# Patient Record
Sex: MALE | Race: WHITE
[De-identification: names, ages, dates, MRNs, and addresses within clinical notes are randomized per-mention and may not be internally consistent; named-entity substitution may affect disease eponyms.]

---

## 2022-11-06 ENCOUNTER — HOSPITAL ENCOUNTER (EMERGENCY)
Dept: HOSPITAL 75 - ER FS | Age: 32
Discharge: LEFT BEFORE DISCHARGE | End: 2022-11-06
Payer: SELF-PAY

## 2022-11-06 ENCOUNTER — HOSPITAL ENCOUNTER (EMERGENCY)
Dept: HOSPITAL 75 - ER FS | Age: 32
LOS: 3 days | Discharge: HOME | End: 2022-11-09
Payer: COMMERCIAL

## 2022-11-06 VITALS — BODY MASS INDEX: 27.13 KG/M2 | HEIGHT: 69.69 IN | WEIGHT: 187.39 LBS

## 2022-11-06 VITALS — SYSTOLIC BLOOD PRESSURE: 122 MMHG | DIASTOLIC BLOOD PRESSURE: 81 MMHG

## 2022-11-06 DIAGNOSIS — Z20.822: ICD-10-CM

## 2022-11-06 DIAGNOSIS — R69: Primary | ICD-10-CM

## 2022-11-06 DIAGNOSIS — F22: Primary | ICD-10-CM

## 2022-11-06 LAB
ALBUMIN SERPL-MCNC: 4.6 GM/DL (ref 3.2–4.5)
ALP SERPL-CCNC: 68 U/L (ref 40–136)
ALT SERPL-CCNC: 31 U/L (ref 0–55)
APAP SERPL-MCNC: < 10 UG/ML (ref 10–30)
APTT PPP: YELLOW S
BACTERIA #/AREA URNS HPF: NEGATIVE /HPF
BARBITURATES UR QL: NEGATIVE
BASOPHILS # BLD AUTO: 0 10^3/UL (ref 0–0.1)
BASOPHILS NFR BLD AUTO: 0 % (ref 0–10)
BENZODIAZ UR QL SCN: NEGATIVE
BILIRUB SERPL-MCNC: 0.7 MG/DL (ref 0.1–1)
BILIRUB UR QL STRIP: NEGATIVE
BUN/CREAT SERPL: 10
CALCIUM SERPL-MCNC: 9 MG/DL (ref 8.5–10.1)
CHLORIDE SERPL-SCNC: 98 MMOL/L (ref 98–107)
CO2 SERPL-SCNC: 23 MMOL/L (ref 21–32)
COCAINE UR QL: NEGATIVE
CREAT SERPL-MCNC: 0.83 MG/DL (ref 0.6–1.3)
EOSINOPHIL # BLD AUTO: 0.2 10^3/UL (ref 0–0.3)
EOSINOPHIL NFR BLD AUTO: 2 % (ref 0–10)
FIBRINOGEN PPP-MCNC: CLEAR MG/DL
GFR SERPLBLD BASED ON 1.73 SQ M-ARVRAT: 118 ML/MIN
GLUCOSE SERPL-MCNC: 105 MG/DL (ref 70–105)
GLUCOSE UR STRIP-MCNC: NEGATIVE MG/DL
HCT VFR BLD CALC: 37 % (ref 40–54)
HGB BLD-MCNC: 13.2 G/DL (ref 13.3–17.7)
KETONES UR QL STRIP: NEGATIVE
LEUKOCYTE ESTERASE UR QL STRIP: NEGATIVE
LYMPHOCYTES # BLD AUTO: 2.9 10^3/UL (ref 1–4)
LYMPHOCYTES NFR BLD AUTO: 32 % (ref 12–44)
MANUAL DIFFERENTIAL PERFORMED BLD QL: NO
MCH RBC QN AUTO: 30 PG (ref 25–34)
MCHC RBC AUTO-ENTMCNC: 36 G/DL (ref 32–36)
MCV RBC AUTO: 83 FL (ref 80–99)
METHADONE UR QL SCN: NEGATIVE
MONOCYTES # BLD AUTO: 0.6 10^3/UL (ref 0–1)
MONOCYTES NFR BLD AUTO: 7 % (ref 0–12)
NEUTROPHILS # BLD AUTO: 5.4 10^3/UL (ref 1.8–7.8)
NEUTROPHILS NFR BLD AUTO: 59 % (ref 42–75)
NITRITE UR QL STRIP: NEGATIVE
OPIATES UR QL SCN: NEGATIVE
OXYCODONE UR QL: NEGATIVE
PH UR STRIP: 6.5 [PH] (ref 5–9)
PLATELET # BLD: 294 10^3/UL (ref 130–400)
PMV BLD AUTO: 9.3 FL (ref 9–12.2)
POTASSIUM SERPL-SCNC: 4.1 MMOL/L (ref 3.6–5)
PROPOXYPH UR QL: NEGATIVE
PROT SERPL-MCNC: 7 GM/DL (ref 6.4–8.2)
PROT UR QL STRIP: NEGATIVE
RBC #/AREA URNS HPF: (no result) /HPF
SALICYLATES SERPL-MCNC: < 0.3 MG/DL (ref 5–20)
SODIUM SERPL-SCNC: 134 MMOL/L (ref 135–145)
SP GR UR STRIP: <=1.005 (ref 1.02–1.02)
SQUAMOUS #/AREA URNS HPF: (no result) /HPF
TRICYCLICS UR QL SCN: NEGATIVE
WBC # BLD AUTO: 9.1 10^3/UL (ref 4.3–11)
WBC #/AREA URNS HPF: (no result) /HPF

## 2022-11-06 PROCEDURE — 80329 ANALGESICS NON-OPIOID 1 OR 2: CPT

## 2022-11-06 PROCEDURE — 85025 COMPLETE CBC W/AUTO DIFF WBC: CPT

## 2022-11-06 PROCEDURE — 99281 EMR DPT VST MAYX REQ PHY/QHP: CPT

## 2022-11-06 PROCEDURE — 80320 DRUG SCREEN QUANTALCOHOLS: CPT

## 2022-11-06 PROCEDURE — 93005 ELECTROCARDIOGRAM TRACING: CPT

## 2022-11-06 PROCEDURE — 80306 DRUG TEST PRSMV INSTRMNT: CPT

## 2022-11-06 PROCEDURE — 36415 COLL VENOUS BLD VENIPUNCTURE: CPT

## 2022-11-06 PROCEDURE — 80053 COMPREHEN METABOLIC PANEL: CPT

## 2022-11-06 PROCEDURE — 99283 EMERGENCY DEPT VISIT LOW MDM: CPT

## 2022-11-06 PROCEDURE — 87636 SARSCOV2 & INF A&B AMP PRB: CPT

## 2022-11-06 PROCEDURE — 81000 URINALYSIS NONAUTO W/SCOPE: CPT

## 2022-11-06 PROCEDURE — 84443 ASSAY THYROID STIM HORMONE: CPT

## 2022-11-06 NOTE — ED GENERAL
General


Stated Complaint:  AMS





History of Present Illness


Date Seen by Provider:  Nov 6, 2022


Time Seen by Provider:  12:49


Initial Comments


Patient states that he was forced to come to the ER by his siblings but he has 

no complaints and does not want to be here.  Patient is leaving AMA





Allergies and Home Medications


Patient Home Medication List


Home Medication List Reviewed:  Yes





Review of Systems


Review of Systems


Constitutional:  see HPI





Physical Exam


Vital Signs


Capillary Refill :


Height, Weight, BMI


Height: '"


Weight: lbs. oz. kg;  BMI


Method:


General Appearance:  No Apparent Distress





Progress/Results/Core Measures


Suspected Sepsis


SIRS


Temperature: 


Pulse:  


Respiratory Rate: 


 


Blood Pressure  / 


Mean:





Results/Orders


Vital Signs/I&O


Capillary Refill :


Progress Note :  


Progress Note


Patient left AMA and does not want to be examined or treated in the ER at this 

time.  Patient appears to be AOx3





Departure


Impression





   Primary Impression:  


   Left against medical advice


Disposition:  07 AGAINST MEDICAL ADVICE


Condition:  Against Medical Advice





Departure-Patient Inst.


Referrals:  


NO,LOCAL PHYSICIAN (PCP/Family)


Primary Care Physician











HENRI RODRÍGUEZ MD               Nov 6, 2022 12:49

## 2022-11-06 NOTE — ED PSYCHOSOCIAL
General


Chief Complaint:  Psych/Social Disorder


Stated Complaint:  PSYCH EVAL





History of Present Illness


Date Seen by Provider:  Nov 6, 2022


Time Seen by Provider:  13:12


Initial Comments


32-year-old male with no significant PMH, was here a little while ago and left 

AMA because his sisters are forcing him to come in and he was stating he felt 

fine.  He was AAO x3 in the ER and was not harmful to himself or others and so 

he left AMA.  He was seen brought back in by police because his sisters were 

stating that he has been increasingly paranoid over the past 1 week, and was 

concerned that his sisters were in trouble and was trying to break down the door

to get to them, but in reality they were blocking the door to keep him out.  

Then patient's brother and patient got in a fight due to this.  Patient's 

siblings want him to be screened and checked out.  Patient is saying that he 

does not want to be here but police intervened and stated that he is being put 

into police custody for a screening to ensure his safety and also the safety of 

others.  Patient agrees to plan to get screened.  Denies fever, pain, dysuria, 

headache, confusion.  Patient currently does not want to hurt himself or others.

 He is not suicidal


 (HENRI RODRÍGUEZ MD)





Allergies and Home Medications


Allergies


Coded Allergies:  


     No Known Drug Allergies (Unverified , 11/7/22)





Patient Home Medication List


Home Medication List Reviewed:  Yes


 (HENRI RODRÍGUEZ MD)





Review of Systems


Constitutional:  no symptoms reported, see HPI


EENTM:  no symptoms reported


Respiratory:  no symptoms reported


Cardiovascular:  no symptoms reported


Gastrointestinal:  no symptoms reported


Genitourinary:  no symptoms reported


Musculoskeletal:  no symptoms reported


Skin:  no symptoms reported


Psychiatric/Neurological:  No Symptoms Reported (HENRI RODRÍGUEZ MD)





Physical Exam





Vital Signs - First Documented








 11/6/22





 13:52


 


Temp 36.5


 


Pulse 65


 


Resp 16


 


B/P (MAP) 122/81 (95)


 


Pulse Ox 97


 


O2 Delivery Room Air





 (REMY LE MD)


Capillary Refill :  


 (HENRI RODRÍGUEZ MD)


Height, Weight, BMI


Height: '"


Weight: lbs. oz. kg;  BMI


Method:


General Appearance:  WD/WN, no apparent distress


HEENT:  PERRL/EOMI


Neck:  non-tender, full range of motion, supple, normal inspection


Respiratory:  lungs clear


Cardiovascular:  regular rate, rhythm


Gastrointestinal:  normal bowel sounds, non tender, soft


Neurologic/Psychiatric:  alert, normal mood/affect, oriented x 3


Appearance/Memory:  appropriate appearance, appropriate insight, neat, no memory

impairment


Behavior/Eye Contact:  cooperative, good eye contact, normal speech


Thoughts/Hallucinations:  normal thought pattern, no apparent hallucination


Skin:  normal color (HENRI RODRÍGUEZ MD)





Progress/Results/Core Measures


Progress


Progress Note :  


Progress Note


1. MENTAL HEALTH SCREENING:


- Labs unremarkable


- UA/ UDS:negative


- EKG normal


- Involuntary hold by police for psych screening since pt did not want to stay.


- Pt has been calm the entire time in the ER


- Awaiting placement





 (HENRI RODRÍGUEZ MD)


Progress Note :  


   Time:  11:20


Progress Note


Patient's had a Zoom meeting with the court.  At the conclusion of this they 

decided the patient would be discharged from the emergency department.  He will 

go directly to [facility] and they will help him from there.

 He was advised not to go back to his family residence where his siblings are 

located.


 (REMY LE MD)


Initial ECG Impression Date:  Nov 6, 2022


Initial ECG Impression Time:  14:23


Initial ECG Rate:  55


Initial ECG Rhythm:  S.Viet


Initial ECG Intervals:  Normal


Initial ECG Impression:  Normal


Initial ECG Comparisson:  No Previous ECG Available


 (HENRI RODRÍGUEZ MD)





Departure


Impression





   Primary Impression:  


   Paranoia


Disposition:  01 HOME, SELF-CARE


Condition:  Stable





Departure-Patient Inst.


Decision time for Depature:  11:28


 (REMY LE MD)


Referrals:  


NO,LOCAL PHYSICIAN (PCP)


Primary Care Physician








[facility] [place]ructions[place]  [place] MENTAL HEALTH SERVICES





Add. Discharge Instructions:  


[place] [facility] [place] by [place] court today.


Follow the instructions and plan as worked out with Mental Health and the court.





All discharge instructions reviewed with patient and/or family. Voiced 

understanding.











HENRI RODRÍGUEZ MD               Nov 6, 2022 13:38


REMY LE MD                Nov 9, 2022 11:31